# Patient Record
Sex: FEMALE | Race: WHITE | NOT HISPANIC OR LATINO | URBAN - METROPOLITAN AREA
[De-identification: names, ages, dates, MRNs, and addresses within clinical notes are randomized per-mention and may not be internally consistent; named-entity substitution may affect disease eponyms.]

---

## 2019-09-12 ENCOUNTER — OUTPATIENT (OUTPATIENT)
Dept: OUTPATIENT SERVICES | Facility: HOSPITAL | Age: 24
LOS: 1 days | End: 2019-09-12
Payer: COMMERCIAL

## 2019-09-12 VITALS
OXYGEN SATURATION: 100 % | SYSTOLIC BLOOD PRESSURE: 124 MMHG | HEIGHT: 61 IN | RESPIRATION RATE: 16 BRPM | WEIGHT: 115.08 LBS | DIASTOLIC BLOOD PRESSURE: 66 MMHG | HEART RATE: 88 BPM | TEMPERATURE: 98 F

## 2019-09-12 DIAGNOSIS — M50.820 OTHER CERVICAL DISC DISORDERS, MID-CERVICAL REGION, UNSPECIFIED LEVEL: ICD-10-CM

## 2019-09-12 DIAGNOSIS — N62 HYPERTROPHY OF BREAST: ICD-10-CM

## 2019-09-12 DIAGNOSIS — M53.85 OTHER SPECIFIED DORSOPATHIES, THORACOLUMBAR REGION: ICD-10-CM

## 2019-09-12 DIAGNOSIS — L30.4 ERYTHEMA INTERTRIGO: ICD-10-CM

## 2019-09-12 DIAGNOSIS — Z01.818 ENCOUNTER FOR OTHER PREPROCEDURAL EXAMINATION: ICD-10-CM

## 2019-09-12 LAB
ANION GAP SERPL CALC-SCNC: 11 MMOL/L — SIGNIFICANT CHANGE UP (ref 5–17)
BUN SERPL-MCNC: 12 MG/DL — SIGNIFICANT CHANGE UP (ref 7–23)
CALCIUM SERPL-MCNC: 9.6 MG/DL — SIGNIFICANT CHANGE UP (ref 8.4–10.5)
CHLORIDE SERPL-SCNC: 102 MMOL/L — SIGNIFICANT CHANGE UP (ref 96–108)
CO2 SERPL-SCNC: 25 MMOL/L — SIGNIFICANT CHANGE UP (ref 22–31)
CREAT SERPL-MCNC: 0.65 MG/DL — SIGNIFICANT CHANGE UP (ref 0.5–1.3)
GLUCOSE SERPL-MCNC: 88 MG/DL — SIGNIFICANT CHANGE UP (ref 70–99)
HCT VFR BLD CALC: 38.7 % — SIGNIFICANT CHANGE UP (ref 34.5–45)
HGB BLD-MCNC: 13.1 G/DL — SIGNIFICANT CHANGE UP (ref 11.5–15.5)
MCHC RBC-ENTMCNC: 29 PG — SIGNIFICANT CHANGE UP (ref 27–34)
MCHC RBC-ENTMCNC: 33.9 GM/DL — SIGNIFICANT CHANGE UP (ref 32–36)
MCV RBC AUTO: 85.8 FL — SIGNIFICANT CHANGE UP (ref 80–100)
NRBC # BLD: 0 /100 WBCS — SIGNIFICANT CHANGE UP (ref 0–0)
PLATELET # BLD AUTO: 291 K/UL — SIGNIFICANT CHANGE UP (ref 150–400)
POTASSIUM SERPL-MCNC: 3.5 MMOL/L — SIGNIFICANT CHANGE UP (ref 3.5–5.3)
POTASSIUM SERPL-SCNC: 3.5 MMOL/L — SIGNIFICANT CHANGE UP (ref 3.5–5.3)
RBC # BLD: 4.51 M/UL — SIGNIFICANT CHANGE UP (ref 3.8–5.2)
RBC # FLD: 12.3 % — SIGNIFICANT CHANGE UP (ref 10.3–14.5)
SODIUM SERPL-SCNC: 138 MMOL/L — SIGNIFICANT CHANGE UP (ref 135–145)
WBC # BLD: 6.25 K/UL — SIGNIFICANT CHANGE UP (ref 3.8–10.5)
WBC # FLD AUTO: 6.25 K/UL — SIGNIFICANT CHANGE UP (ref 3.8–10.5)

## 2019-09-12 PROCEDURE — 36415 COLL VENOUS BLD VENIPUNCTURE: CPT

## 2019-09-12 PROCEDURE — 80048 BASIC METABOLIC PNL TOTAL CA: CPT

## 2019-09-12 PROCEDURE — G0463: CPT

## 2019-09-12 PROCEDURE — 85027 COMPLETE CBC AUTOMATED: CPT

## 2019-09-12 NOTE — H&P PST ADULT - NSANTHOSAYNRD_GEN_A_CORE
No. OBDULIO screening performed.  STOP BANG Legend: 0-2 = LOW Risk; 3-4 = INTERMEDIATE Risk; 5-8 = HIGH Risk

## 2019-09-12 NOTE — H&P PST ADULT - FUNCTIONAL SCREEN CURRENT LEVEL: EATING, MLM
EXAM DATE:  5/31/2018 9:58 AM EDT

AGE/SEX:        47 years / Female



INDICATIONS:  Patient with possible multiple sclerosis in need of lumbar puncture.



CLINICAL DATA:  This is the patient's initial encounter. Patient reports that signs and symptoms have
 been present for 7 - 11 months and indicates a pain score of 0/10. 

                                                                          

MEDICAL/SURGICAL HISTORY:       Hypertension. Neuropathy.MI.Transverse myelitis.  Hysterectomy. Prola
psed bladder



COMPARISON:      No prior [facility] exams available for comparison.



FLUORO TIME (min):     0.29

IMAGE SERIES:  1

ACCESS SITE:  L2-3       



LUMBAR PUNCTURE TIME:  0921 hours

OPENING PRESSURE: 28 cm of water









FLUID: Total volume of 9 cc of clear fluid was removed. Fluid was sent to lab for ordered studies. 











 

. .



PROCEDURE:

1.  Fluoroscopic guided lumbar puncture.

2.  Recording of opening pressure.



The risks, benefits and alternatives to the procedure were explained and verbal and written consent w
as obtained.  The site was prepped in sterile fashion.  Full sterile technique was used, including ca
p, mask, sterile gloves and gown and a large sterile sheet.  Hand hygiene and 2% chlorhexidine and/or
 betadine/alcohol prep was utilized per protocol for cutaneous antisepsis.  The skin and subcutaneous
 tissues were infiltrated with local anesthetic solution.



With fluoroscopic guidance the lumbar thecal sac was punctured at the above level described above and
 the opening pressure was recorded.  The above described fluid was removed without difficulty.



The patient tolerated the procedure well and there were no complications.



CONCLUSION:  

1.  Uncomplicated fluoroscopically guided lumbar puncture with pressures as above.



Electronically signed by: Jason West MD  5/31/2018 3:25 PM EDT
0 = independent

## 2019-09-12 NOTE — H&P PST ADULT - RS GEN PE MLT RESP DETAILS PC
normal/clear to auscultation bilaterally/no chest wall tenderness/airway patent/breath sounds equal/respirations non-labored/no intercostal retractions/good air movement

## 2019-09-12 NOTE — H&P PST ADULT - HISTORY OF PRESENT ILLNESS
23 y/o female presents for PST. As per patient she has been experiencing back pain due to large breast.

## 2019-09-12 NOTE — H&P PST ADULT - NSICDXPROBLEM_GEN_ALL_CORE_FT
PROBLEM DIAGNOSES  Problem: Large breasts  Assessment and Plan: Patient schedule for bilateral breast reduction. Labs pending.

## 2019-09-25 ENCOUNTER — OUTPATIENT (OUTPATIENT)
Dept: OUTPATIENT SERVICES | Facility: HOSPITAL | Age: 24
LOS: 1 days | End: 2019-09-25
Payer: COMMERCIAL

## 2019-09-25 VITALS
HEART RATE: 78 BPM | RESPIRATION RATE: 16 BRPM | TEMPERATURE: 98 F | SYSTOLIC BLOOD PRESSURE: 104 MMHG | DIASTOLIC BLOOD PRESSURE: 67 MMHG | OXYGEN SATURATION: 100 %

## 2019-09-25 DIAGNOSIS — L30.4 ERYTHEMA INTERTRIGO: ICD-10-CM

## 2019-09-25 DIAGNOSIS — M50.820 OTHER CERVICAL DISC DISORDERS, MID-CERVICAL REGION, UNSPECIFIED LEVEL: ICD-10-CM

## 2019-09-25 DIAGNOSIS — N62 HYPERTROPHY OF BREAST: ICD-10-CM

## 2019-09-25 DIAGNOSIS — M53.85 OTHER SPECIFIED DORSOPATHIES, THORACOLUMBAR REGION: ICD-10-CM

## 2019-09-25 DIAGNOSIS — K08.409 PARTIAL LOSS OF TEETH, UNSPECIFIED CAUSE, UNSPECIFIED CLASS: Chronic | ICD-10-CM

## 2019-09-25 PROCEDURE — 88305 TISSUE EXAM BY PATHOLOGIST: CPT | Mod: 26

## 2019-09-25 PROCEDURE — 19318 BREAST REDUCTION: CPT | Mod: 50

## 2019-09-25 PROCEDURE — 88305 TISSUE EXAM BY PATHOLOGIST: CPT

## 2019-09-25 RX ORDER — SODIUM CHLORIDE 9 MG/ML
1000 INJECTION, SOLUTION INTRAVENOUS
Refills: 0 | Status: DISCONTINUED | OUTPATIENT
Start: 2019-09-25 | End: 2019-09-25

## 2019-09-25 RX ORDER — ONDANSETRON 8 MG/1
4 TABLET, FILM COATED ORAL ONCE
Refills: 0 | Status: COMPLETED | OUTPATIENT
Start: 2019-09-25 | End: 2019-09-25

## 2019-09-25 RX ORDER — OXYCODONE HYDROCHLORIDE 5 MG/1
5 TABLET ORAL EVERY 6 HOURS
Refills: 0 | Status: DISCONTINUED | OUTPATIENT
Start: 2019-09-25 | End: 2019-09-25

## 2019-09-25 RX ORDER — IBUPROFEN 200 MG
1 TABLET ORAL
Qty: 0 | Refills: 0 | DISCHARGE

## 2019-09-25 RX ORDER — SCOPALAMINE 1 MG/3D
1 PATCH, EXTENDED RELEASE TRANSDERMAL ONCE
Refills: 0 | Status: COMPLETED | OUTPATIENT
Start: 2019-09-25 | End: 2019-09-25

## 2019-09-25 RX ORDER — HYDROMORPHONE HYDROCHLORIDE 2 MG/ML
0.5 INJECTION INTRAMUSCULAR; INTRAVENOUS; SUBCUTANEOUS ONCE
Refills: 0 | Status: DISCONTINUED | OUTPATIENT
Start: 2019-09-25 | End: 2019-09-25

## 2019-09-25 RX ADMIN — SCOPALAMINE 1 PATCH: 1 PATCH, EXTENDED RELEASE TRANSDERMAL at 17:35

## 2019-09-25 RX ADMIN — SODIUM CHLORIDE 50 MILLILITER(S): 9 INJECTION, SOLUTION INTRAVENOUS at 11:38

## 2019-09-25 RX ADMIN — ONDANSETRON 4 MILLIGRAM(S): 8 TABLET, FILM COATED ORAL at 15:25

## 2019-09-25 NOTE — PRE-ANESTHESIA EVALUATION ADULT - NSANTHADDINFOFT_GEN_ALL_CORE
Discussed GA in detail with patient and all questions sought and answered. Pt. agrees to all plans and wishes to proceed with surgical care.

## 2019-09-25 NOTE — ASU DISCHARGE PLAN (ADULT/PEDIATRIC) - CARE PROVIDER_API CALL
Walter Woodson (MD)  Plastic Surgery; Surgery  [address], [address]  [place] [address]  Phone: [phone]  Fax: [phone]  Follow Up Time: 1 week

## 2019-09-25 NOTE — ASU DISCHARGE PLAN (ADULT/PEDIATRIC) - CALL YOUR DOCTOR IF YOU HAVE ANY OF THE FOLLOWING:
Inability to tolerate liquids or foods/Pain not relieved by Medications/Bleeding that does not stop/Swelling that gets worse/Nausea and vomiting that does not stop

## 2019-09-27 LAB — SURGICAL PATHOLOGY STUDY: SIGNIFICANT CHANGE UP

## 2021-06-21 NOTE — H&P PST ADULT - ASSESSMENT
[No studies available for review at this time.] : No studies available for review at this time. Patient presents c/o back pain due to large breast.

## 2022-04-27 NOTE — ASU DISCHARGE PLAN (ADULT/PEDIATRIC) - ACTIVITY LEVEL
[de-identified] : Right elbow AP/lateral x-rays loaded from outside facility: Positive posterior fat pad sign noted.  Positive cortical irregularity noted the posterior aspect of the supracondylar region.  Growth plates are open. The radiocapitellar articulation is normal. The anterior humeral line intersects the capitellum.\par \par 4/7/2022: XR of R elbow OOC taken in office: + interval healing of occult ALMA fracture with periosteal bone healing, AHL intersects capitellum, RC articulation is normal No tub baths/No excercise/No heavy lifting/No sports/gym